# Patient Record
Sex: MALE | Race: OTHER | HISPANIC OR LATINO | URBAN - METROPOLITAN AREA
[De-identification: names, ages, dates, MRNs, and addresses within clinical notes are randomized per-mention and may not be internally consistent; named-entity substitution may affect disease eponyms.]

---

## 2022-02-22 ENCOUNTER — INPATIENT (INPATIENT)
Facility: HOSPITAL | Age: 32
LOS: 58 days | Discharge: HOME | End: 2022-04-22
Attending: SURGERY | Admitting: SURGERY
Payer: MEDICAID

## 2022-02-22 DIAGNOSIS — M79.A21 NONTRAUMATIC COMPARTMENT SYNDROME OF RIGHT LOWER EXTREMITY: ICD-10-CM

## 2022-02-22 DIAGNOSIS — L02.612 CUTANEOUS ABSCESS OF LEFT FOOT: ICD-10-CM

## 2022-02-22 DIAGNOSIS — R73.9 HYPERGLYCEMIA, UNSPECIFIED: ICD-10-CM

## 2022-02-22 DIAGNOSIS — B96.20 UNSPECIFIED ESCHERICHIA COLI [E. COLI] AS THE CAUSE OF DISEASES CLASSIFIED ELSEWHERE: ICD-10-CM

## 2022-02-22 DIAGNOSIS — J96.90 RESPIRATORY FAILURE, UNSPECIFIED, UNSPECIFIED WHETHER WITH HYPOXIA OR HYPERCAPNIA: ICD-10-CM

## 2022-02-22 DIAGNOSIS — L97.529 NON-PRESSURE CHRONIC ULCER OF OTHER PART OF LEFT FOOT WITH UNSPECIFIED SEVERITY: ICD-10-CM

## 2022-02-22 DIAGNOSIS — E86.0 DEHYDRATION: ICD-10-CM

## 2022-02-22 DIAGNOSIS — L02.611 CUTANEOUS ABSCESS OF RIGHT FOOT: ICD-10-CM

## 2022-02-22 DIAGNOSIS — A41.9 SEPSIS, UNSPECIFIED ORGANISM: ICD-10-CM

## 2022-02-22 DIAGNOSIS — B96.89 OTHER SPECIFIED BACTERIAL AGENTS AS THE CAUSE OF DISEASES CLASSIFIED ELSEWHERE: ICD-10-CM

## 2022-02-22 DIAGNOSIS — L97.519 NON-PRESSURE CHRONIC ULCER OF OTHER PART OF RIGHT FOOT WITH UNSPECIFIED SEVERITY: ICD-10-CM

## 2022-02-22 PROCEDURE — 73701 CT LOWER EXTREMITY W/DYE: CPT | Mod: 26,RT

## 2022-02-22 PROCEDURE — 88304 TISSUE EXAM BY PATHOLOGIST: CPT | Mod: 26

## 2022-02-22 PROCEDURE — 93010 ELECTROCARDIOGRAM REPORT: CPT

## 2022-02-22 PROCEDURE — 71045 X-RAY EXAM CHEST 1 VIEW: CPT | Mod: 26,77

## 2022-02-22 PROCEDURE — 99285 EMERGENCY DEPT VISIT HI MDM: CPT

## 2022-02-22 PROCEDURE — 73610 X-RAY EXAM OF ANKLE: CPT | Mod: 26,RT

## 2022-02-22 PROCEDURE — 73590 X-RAY EXAM OF LOWER LEG: CPT | Mod: 26,RT

## 2022-02-22 PROCEDURE — 71045 X-RAY EXAM CHEST 1 VIEW: CPT | Mod: 26

## 2022-02-22 PROCEDURE — 73630 X-RAY EXAM OF FOOT: CPT | Mod: 26,LT,76

## 2022-02-22 PROCEDURE — 73552 X-RAY EXAM OF FEMUR 2/>: CPT | Mod: 26,RT

## 2022-02-23 ENCOUNTER — OUTPATIENT (OUTPATIENT)
Dept: OUTPATIENT SERVICES | Facility: HOSPITAL | Age: 32
LOS: 1 days | End: 2022-02-23

## 2022-02-24 ENCOUNTER — OUTPATIENT (OUTPATIENT)
Dept: OUTPATIENT SERVICES | Facility: HOSPITAL | Age: 32
LOS: 1 days | End: 2022-02-24

## 2022-02-24 PROCEDURE — 71045 X-RAY EXAM CHEST 1 VIEW: CPT | Mod: 26

## 2022-02-24 PROCEDURE — 93306 TTE W/DOPPLER COMPLETE: CPT | Mod: 26

## 2022-02-25 ENCOUNTER — OUTPATIENT (OUTPATIENT)
Dept: OUTPATIENT SERVICES | Facility: HOSPITAL | Age: 32
LOS: 1 days | End: 2022-02-25
Payer: MEDICAID

## 2022-02-26 PROCEDURE — 71045 X-RAY EXAM CHEST 1 VIEW: CPT | Mod: 26

## 2022-02-28 PROCEDURE — 88304 TISSUE EXAM BY PATHOLOGIST: CPT | Mod: 26

## 2022-02-28 PROCEDURE — 99253 IP/OBS CNSLTJ NEW/EST LOW 45: CPT

## 2022-02-28 PROCEDURE — 73701 CT LOWER EXTREMITY W/DYE: CPT | Mod: 26,RT

## 2022-03-05 PROCEDURE — 71045 X-RAY EXAM CHEST 1 VIEW: CPT | Mod: 26

## 2022-03-07 PROCEDURE — 71045 X-RAY EXAM CHEST 1 VIEW: CPT | Mod: 26

## 2022-03-09 DIAGNOSIS — R65.20 SEVERE SEPSIS WITHOUT SEPTIC SHOCK: ICD-10-CM

## 2022-03-09 DIAGNOSIS — M72.6 NECROTIZING FASCIITIS: ICD-10-CM

## 2022-03-10 PROCEDURE — 99232 SBSQ HOSP IP/OBS MODERATE 35: CPT

## 2022-03-13 PROCEDURE — 71045 X-RAY EXAM CHEST 1 VIEW: CPT | Mod: 26

## 2022-03-17 PROCEDURE — 13121 CMPLX RPR S/A/L 2.6-7.5 CM: CPT

## 2022-03-17 PROCEDURE — 97606 NEG PRS WND THER DME>50 SQCM: CPT | Mod: 59

## 2022-03-17 PROCEDURE — 13122 CMPLX RPR S/A/L ADDL 5 CM/>: CPT

## 2022-03-28 PROCEDURE — 13122 CMPLX RPR S/A/L ADDL 5 CM/>: CPT

## 2022-03-28 PROCEDURE — 13121 CMPLX RPR S/A/L 2.6-7.5 CM: CPT

## 2022-04-15 ENCOUNTER — APPOINTMENT (OUTPATIENT)
Dept: PLASTIC SURGERY | Facility: HOSPITAL | Age: 32
End: 2022-04-15
Payer: MEDICAID

## 2022-04-15 PROCEDURE — 15101 SPLT AGRFT T/A/L EA ADDL 100: CPT

## 2022-04-15 PROCEDURE — 15100 SPLT AGRFT T/A/L 1ST 100SQCM: CPT

## 2022-04-15 PROCEDURE — 97606 NEG PRS WND THER DME>50 SQCM: CPT | Mod: 59

## 2022-04-21 PROBLEM — Z00.00 ENCOUNTER FOR PREVENTIVE HEALTH EXAMINATION: Status: ACTIVE | Noted: 2022-04-21

## 2022-04-27 DIAGNOSIS — E87.1 HYPO-OSMOLALITY AND HYPONATREMIA: ICD-10-CM
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
## 2022-04-27 DIAGNOSIS — M62.82 RHABDOMYOLYSIS: ICD-10-CM

## 2022-04-27 DIAGNOSIS — M72.6 NECROTIZING FASCIITIS: ICD-10-CM

## 2022-04-27 DIAGNOSIS — M79.604 PAIN IN RIGHT LEG: ICD-10-CM

## 2022-04-27 DIAGNOSIS — R65.21 SEVERE SEPSIS WITH SEPTIC SHOCK: ICD-10-CM

## 2022-04-27 DIAGNOSIS — A41.89 OTHER SPECIFIED SEPSIS: ICD-10-CM

## 2022-04-27 DIAGNOSIS — J95.821 ACUTE POSTPROCEDURAL RESPIRATORY FAILURE: ICD-10-CM

## 2022-04-27 DIAGNOSIS — D62 ACUTE POSTHEMORRHAGIC ANEMIA: ICD-10-CM

## 2022-04-27 DIAGNOSIS — E87.2 ACIDOSIS: ICD-10-CM

## 2022-04-27 DIAGNOSIS — U07.1 COVID-19: ICD-10-CM

## 2022-04-28 ENCOUNTER — APPOINTMENT (OUTPATIENT)
Dept: PLASTIC SURGERY | Facility: CLINIC | Age: 32
End: 2022-04-28

## 2022-05-05 ENCOUNTER — APPOINTMENT (OUTPATIENT)
Dept: PLASTIC SURGERY | Facility: CLINIC | Age: 32
End: 2022-05-05
Payer: MEDICAID

## 2022-05-05 VITALS
WEIGHT: 165 LBS | DIASTOLIC BLOOD PRESSURE: 74 MMHG | HEIGHT: 67 IN | RESPIRATION RATE: 16 BRPM | TEMPERATURE: 98 F | SYSTOLIC BLOOD PRESSURE: 110 MMHG | BODY MASS INDEX: 25.9 KG/M2 | OXYGEN SATURATION: 98 % | HEART RATE: 91 BPM

## 2022-05-05 DIAGNOSIS — F17.200 NICOTINE DEPENDENCE, UNSPECIFIED, UNCOMPLICATED: ICD-10-CM

## 2022-05-05 DIAGNOSIS — Z78.9 OTHER SPECIFIED HEALTH STATUS: ICD-10-CM

## 2022-05-05 DIAGNOSIS — Z83.3 FAMILY HISTORY OF DIABETES MELLITUS: ICD-10-CM

## 2022-05-05 PROCEDURE — 99024 POSTOP FOLLOW-UP VISIT: CPT

## 2022-05-05 RX ORDER — IBUPROFEN 600 MG/1
600 TABLET, FILM COATED ORAL
Refills: 0 | Status: ACTIVE | COMMUNITY

## 2022-05-05 NOTE — HISTORY OF PRESENT ILLNESS
[FreeTextEntry1] : Pt reports he is doing well.\par He reports walking more, but getting more swollen when he does.\par He is asking about certain foods he should or should not eat for good healing.\par Doing daily dressings.\par Showering, but keeping his leg covered in a bag and not letting it get wet.\par \par \par Hospital course significant for multiple I+D procedures by Alexey Che and Nathaly for necrotizing soft tissue infection involving the thigh, calf and ankle including collections lateral to the femur and deep to the gastroc and soleus proximally into the popliteal fossa. He was also taken to the OR twice a week for a prolonged period of time to closue the thigh wounds (medial on 3/17/22 and lateral on 3/28/22). STSG from the right thigh to the right medial and lateral lower leg performed by me on 4/15/22.

## 2022-05-05 NOTE — REASON FOR VISIT
[Consultation] : a consultation visit [[employer] ] : provided by [employer]   [FreeTextEntry1] : The pt is here to be seen for a wound on his right lower leg that has been present for the last 2.5 months. The wounds originated after walking through the the [place] [facility] and developing blisters on his feet.  [Interpreters_IDNumber] : [id number] [Interpreters_FullName] : Kaz [TWNoteComboBox1] : [place]

## 2022-05-05 NOTE — PHYSICAL EXAM
[NI] : Normal [de-identified] : NL respiratory effort noted  [de-identified] : Right thigh donor sites healed, just flaking dry skin.\par Right medial and lateral thigh scars healed.\par Right lateral knee wound nearly healed. No sinus, just some crust on the surface.\par Right medial and lateral calf skin grafts fully epithelialized. Some crust and suture remnants.\par These areas were cleansed with a saline soaked 4x4.\par Foot graft site is healed.

## 2022-05-05 NOTE — ASSESSMENT
[FreeTextEntry1] : Doing well s/p STSG\par I emphasized several times during the visit that it is more important not to smoke than to worry about what foods to eat.\par Continue daily dressings.\par Shower with all wounds exposed to the water. Wipe gently with a washcloth.\par Moisturize the thigh donor sites with vaseline as well as all other dry areas.\par Dressings may only be needed for another weeks to 10 days.\par Follow up in two weeks.

## 2022-05-25 ENCOUNTER — APPOINTMENT (OUTPATIENT)
Dept: PLASTIC SURGERY | Facility: CLINIC | Age: 32
End: 2022-05-25
Payer: MEDICAID

## 2022-05-25 VITALS
WEIGHT: 151 LBS | BODY MASS INDEX: 23.7 KG/M2 | SYSTOLIC BLOOD PRESSURE: 106 MMHG | TEMPERATURE: 98.1 F | RESPIRATION RATE: 16 BRPM | OXYGEN SATURATION: 98 % | HEIGHT: 67 IN | DIASTOLIC BLOOD PRESSURE: 70 MMHG | HEART RATE: 85 BPM

## 2022-05-25 DIAGNOSIS — S71.101D UNSPECIFIED OPEN WOUND, RIGHT THIGH, SUBSEQUENT ENCOUNTER: ICD-10-CM

## 2022-05-25 DIAGNOSIS — S81.801D UNSPECIFIED OPEN WOUND, RIGHT LOWER LEG, SUBSEQUENT ENCOUNTER: ICD-10-CM

## 2022-05-25 PROCEDURE — 99024 POSTOP FOLLOW-UP VISIT: CPT

## 2022-05-25 NOTE — REASON FOR VISIT
[Follow-Up: _____] : a [unfilled] follow-up visit [[employer] ] : provided by [employer]   [FreeTextEntry1] : Pt thinks that the leg is healing well and has no complaints or concerns  [Interpreters_IDNumber] : [id number] [Interpreters_FullName] : Sukumar [TWNoteComboBox1] : [place]

## 2022-05-25 NOTE — ASSESSMENT
[FreeTextEntry1] : Doing well postop.\par He can use less vaseline as it is on very thick right now.\par I reviewed scar care as follows:\par \par Scars tend to heal fairly quickly (in 4-14 days depending on the site), then mature slowly over time. The vast majority of improvement occurs in the first 2-3 months, with the pink/red scar becoming softer, more pliable and lighter in color (depending upon skin tone). At 6 months, the scar appears lighter and at approximately one year, the scar reaches maximum improvement (with a range from 9-18 months).\par \par Silicone gel use should begin after all scabs and crust are gone and the scars are smooth and dry, usually about 3 weeks. The silicone gel should be used twice a day for a treatment course of three months to achieve benefit. \par \par Follow up in three months.

## 2022-05-25 NOTE — PHYSICAL EXAM
[NI] : Normal [de-identified] : NL respiratory effort noted  [de-identified] : Right lower leg with moderate edema.\par All areas have healed. \par No open wounds.\par No erythema.

## 2022-05-25 NOTE — HISTORY OF PRESENT ILLNESS
[FreeTextEntry1] : Pt reports he feels he is healing well and is walking more.\par He notes that his leg is more swollen when he is up and less so when he sits down and puts the foot up.

## 2022-08-31 ENCOUNTER — APPOINTMENT (OUTPATIENT)
Dept: PLASTIC SURGERY | Facility: CLINIC | Age: 32
End: 2022-08-31